# Patient Record
Sex: FEMALE | Race: WHITE
[De-identification: names, ages, dates, MRNs, and addresses within clinical notes are randomized per-mention and may not be internally consistent; named-entity substitution may affect disease eponyms.]

---

## 2017-01-22 NOTE — EMERGENCY ROOM VISIT NOTE
History


Report prepared by Sharron:  Gabbi Mariee


Under the Supervision of:  Dr. Escobar Rosenberg M.D.


First contact with patient:  21:46


Chief Complaint:  VERTIGO


Stated Complaint:  SORES,NOSE BLEED,VAGINAL BLEEDING,VERTIGO


Nursing Triage Summary:  


Triage Notes:  rash/scabs on torso and arms. pt reports she had a nose bleed 


today. pt reports she had her period on 1/3 and got it again today.  pt reports 


she has never had an irregular period.  reports noticed more frequent bruising 


recently.  also c/o joint pain.





History of Present Illness


The patient is a 21 year old female who presents to the Emergency Room via 

mother with complaints of persistent vertigo with onset PTA. The patient notes 

that she has sores all over her body. Today, she became dizzy after she stood 

up. She has some chest tightness. The patient states that she had a nose bleed 

today. The patient had her period on 1/3 and got her period again today. She 

notes that she has never had an irregular period. The patient has noticed more 

frequent bruising and she has some joint pain. The patient has some abdominal 

pain. The patient has headaches and feels as if she may pass out. When she lies 

down, she feels as if a film goes over her eyes. When asked about any 

medications or drug use, the patient states she had been abusing 

methamphetamine. She had been using this weekly for the past three months. She 

states she was using IV meth and that she has now stopped.  Pt denies LOC, 

fevers, chills, diaphoresis, visual changes, neck pain, chest pain, breathing 

difficulties, nausea, vomiting, abdominal pain, back pain, melena, hematochezia

, urinary symptoms, numbness, weakness, lymphadenopathy, or other complaints.





   Source of History:  patient


   Onset:  PTA


   Position:  other (global)


   Quality:  other (vertigo)


   Timing:  other (persistent)


   Associated Symptoms:  + headache


Note:


The patient notes that she has sores all over her body. Today, she became dizzy 

after she stood up. She has some chest tightness. The patient states that she 

had a nose bleed today. The patient had her period on 1/3 and got her period 

again today. The patient has noticed more frequent bruising and she has some 

joint pain.  When she lies down, she feels as if a film goes over her eyes.





Review of Systems


See HPI for pertinent positives and negatives.  A total of ten systems were 

reviewed and were otherwise negative.





Past Medical & Surgical


Medical Problems:


(1) Asthma


(2) Elevated liver enzymes


(3) possible labor


(4) Pregnancy


Surgical Problems:


(1) H/O wisdom tooth extraction








Family History





Cancer


Diabetes mellitus


Hypertension





Social History


Smoking Status:  Never Smoker


Alcohol Use:  none


Drug Use:  none


Housing Status:  lives alone


Occupation Status:  unemployed





Current/Historical Medications


Scheduled PRN


Albuterol (Ventolin Hfa), 2 PUFF INH Q4 PRN for SOB/Wheezing





Allergies


Coded Allergies:  


     No Known Allergies (Unverified , 7/11/16)





Physical Exam


Vital Signs











  Date Time  Temp Pulse Resp B/P Pulse Ox O2 Delivery O2 Flow Rate FiO2


 


1/22/17 00:20 36.6 74 18 107/69 97   


 


1/22/17 00:08  74 18 107/69 97 Room Air  


 


1/21/17 23:34  78 17  97   


 


1/21/17 23:29  81 17  97   


 


1/21/17 23:24  76 17  96   


 


1/21/17 23:19  93 16  98   


 


1/21/17 23:14  75 18  97   


 


1/21/17 23:09  79 18  97   


 


1/21/17 23:04  90 18  96   


 


1/21/17 22:59  91 29  97   


 


1/21/17 22:58    115/66    


 


1/21/17 22:54  80 21  97   


 


1/21/17 22:49  78 20  97   


 


1/21/17 22:44  81 25  97   


 


1/21/17 22:39  84 22  98   


 


1/21/17 22:34  76 18  97   


 


1/21/17 22:29  83 20  95   


 


1/21/17 22:24  82 21  99   


 


1/21/17 22:19  82 22  96   


 


1/21/17 22:14  78 15  97   


 


1/21/17 22:10  82      


 


1/21/17 22:09  83 21  98   


 


1/21/17 22:08  79 18 113/65 97 Room Air  


 


1/21/17 22:07     96 Room Air  


 


1/21/17 22:07    113/65    


 


1/21/17 20:40 36.6 89 18 139/92 96 Room Air  











Physical Exam


GENERAL: Awake, alert, well-appearing, in no distress


HENT: Normocephalic, atraumatic. Oropharynx unremarkable. Nose is unremarkable.


EYES: Normal conjunctiva. Sclera non-icteric.


NECK: Supple. No nuchal rigidity. FROM. No JVD.


RESPIRATORY: Clear to auscultation.


CARDIAC: Regular rate, normal rhythm. Extremities warm and well perfused. 

Pulses equal.


ABDOMEN: Soft, non-distended. No tenderness to palpation. No rebound or 

guarding. No masses.


RECTAL: Deferred.


MUSCULOSKELETAL: Chest examination reveals no tenderness. There are small 

excoriations on the chest wall.  The back is symmetrical on inspection without 

obvious abnormality. There is no CVA tenderness to palpation. No joint edema. 

There are track marks on the left AC. 


LOWER EXTREMITIES: Calves are equal size bilaterally and non-tender. No edema. 

No discoloration. 


NEURO: Normal sensorium. No sensory or motor deficits noted. 


SKIN: No rash or jaundice noted.





Medical Decision & Procedures


Laboratory Results


1/21/17 22:01








Red Blood Count 5.25, Mean Corpuscular Volume 82.3, Mean Corpuscular Hemoglobin 

28.0, Mean Corpuscular Hemoglobin Concent 34.0, Mean Platelet Volume 9.3, 

Neutrophils (%) (Auto) 47.1, Lymphocytes (%) (Auto) 39.7, Monocytes (%) (Auto) 

10.9, Eosinophils (%) (Auto) 1.7, Basophils (%) (Auto) 0.5, Neutrophils # (Auto

) 3.84, Lymphocytes # (Auto) 3.24, Monocytes # (Auto) 0.89, Eosinophils # (Auto

) 0.14, Basophils # (Auto) 0.04





1/21/17 22:01

















Test


  1/21/17


22:01 1/21/17


22:05 1/21/17


23:55


 


White Blood Count


  8.16 K/uL


(4.8-10.8) 


  


 


 


Red Blood Count


  5.25 M/uL


(4.2-5.4) 


  


 


 


Hemoglobin


  14.7 g/dL


(12.0-16.0) 


  


 


 


Hematocrit 43.2 % (37-47)   


 


Mean Corpuscular Volume


  82.3 fL


() 


  


 


 


Mean Corpuscular Hemoglobin


  28.0 pg


(25-34) 


  


 


 


Mean Corpuscular Hemoglobin


Concent 34.0 g/dl


(32-36) 


  


 


 


Platelet Count


  397 K/uL


(130-400) 


  


 


 


Mean Platelet Volume


  9.3 fL


(7.4-10.4) 


  


 


 


Neutrophils (%) (Auto) 47.1 %   


 


Lymphocytes (%) (Auto) 39.7 %   


 


Monocytes (%) (Auto) 10.9 %   


 


Eosinophils (%) (Auto) 1.7 %   


 


Basophils (%) (Auto) 0.5 %   


 


Neutrophils # (Auto)


  3.84 K/uL


(1.4-6.5) 


  


 


 


Lymphocytes # (Auto)


  3.24 K/uL


(1.2-3.4) 


  


 


 


Monocytes # (Auto)


  0.89 K/uL


(0.11-0.59) 


  


 


 


Eosinophils # (Auto)


  0.14 K/uL


(0-0.5) 


  


 


 


Basophils # (Auto)


  0.04 K/uL


(0-0.2) 


  


 


 


RDW Standard Deviation


  41.6 fL


(36.4-46.3) 


  


 


 


RDW Coefficient of Variation


  13.9 %


(11.5-14.5) 


  


 


 


Immature Granulocyte % (Auto) 0.1 %   


 


Immature Granulocyte # (Auto)


  0.01 K/uL


(0.00-0.02) 


  


 


 


Prothrombin Time


  11.0 SECONDS


(9.0-12.0) 


  


 


 


Prothromb Time International


Ratio 1.0 (0.9-1.1) 


  


  


 


 


Activated Partial


Thromboplast Time 32.9 SECONDS


(21.0-31.0) 


  


 


 


Partial Thromboplastin Ratio 1.3   


 


Anion Gap


  11.0 mmol/L


(3-11) 


  


 


 


Est Creatinine Clear Calc


Drug Dose 133.2 ml/min 


  


  


 


 


Estimated GFR (


American) 125.8 


  


  


 


 


Estimated GFR (Non-


American 108.5 


  


  


 


 


BUN/Creatinine Ratio 13.5 (10-20)   


 


Calcium Level


  9.6 mg/dl


(8.5-10.1) 


  


 


 


Total Bilirubin


  0.3 mg/dl


(0.2-1) 


  


 


 


Aspartate Amino Transf


(AST/SGOT) 17 U/L (15-37) 


  


  


 


 


Alanine Aminotransferase


(ALT/SGPT) 26 U/L (12-78) 


  


  


 


 


Alkaline Phosphatase


  109 U/L


() 


  


 


 


Total Protein


  8.6 gm/dl


(6.4-8.2) 


  


 


 


Albumin


  4.3 gm/dl


(3.4-5.0) 


  


 


 


Globulin


  4.3 gm/dl


(2.5-4.0) 


  


 


 


Albumin/Globulin Ratio 1.0 (0.9-2)   


 


Urine Color  YELLOW  


 


Urine Appearance  CLEAR (CLEAR)  


 


Urine pH  5.5 (4.5-7.5)  


 


Urine Specific Gravity


  


  1.030


(1.000-1.030) 


 


 


Urine Protein  NEG (NEG)  


 


Urine Glucose (UA)  NEG (NEG)  


 


Urine Ketones  TRACE (NEG)  


 


Urine Occult Blood  2+ (NEG)  


 


Urine Nitrite  NEG (NEG)  


 


Urine Bilirubin  NEG (NEG)  


 


Urine Urobilinogen  NEG (NEG)  


 


Urine Leukocyte Esterase  NEG (NEG)  


 


Urine WBC (Auto)  1-5 /hpf (0-5)  


 


Urine RBC (Auto)


  


  5-10 /hpf


(0-4) 


 


 


Urine Hyaline Casts (Auto)  1-5 /lpf (0-5)  


 


Urine Epithelial Cells (Auto)


  


  10-20 /lpf


(0-5) 


 


 


Urine Bacteria (Auto)  NEG (NEG)  


 


Urine Pregnancy Test  NEG (NEG)  


 


Bedside Glucose


  


  


  88 mg/dl


(70-90)





Laboratory results reviewed by me





ED Course


2220: The patient was evaluated in room B7. A complete history and physical 

exam was performed.





2358: I reevaluated the patient. She is doing well and has agreed to stop doing 

drugs. Discussed results and discharge instructions: She verbalized 

understanding and agreement. The patient is ready for discharge.





Medical Decision


Triage Nursing notes reviewed.


The patient's presentation and history were concerning for the symptoms above 

and methamphetamine use.  





Etiologies such as toxicologic, metabolic, infection, hypo/hyperglycemia, 

electrolyte abnormalities, cardiac sources, intracerebral event,  neurologic, 

as well as others were entertained.   





The patient was evaluated.  She had multiple complaints.  Her skin lesions are 

consistent with that seen within methamphetamine abuse.  Nose examination was 

unremarkable.  The patient has had some irregularity in her menstrual cycle.  

She had some dizziness and general malaise.  There is no sign of infection but 

she did have track marks.  I believe that her issue is due to her drug abuse.  

The patient had an unremarkable CBC, urinalysis, coags, chemistry panel except 

for a mildly low glucose.  I believe this was technical and it was repeated and 

was normal.  The patient was counseled on her methamphetamine abuse.  She has 

not used in over a week.  I asked her to follow closely with her primary 

physician for additional help with this.  If she worsens in any way she will 

come back to the Emergency Room.  Family was with her and was in agreement.





By the evaluation outlined above other emergent etiologies such as those listed 

in the differential, as well as others, were deemed relatively unlikely.  





The patient family were informed about the findings as listed above.  All 

questions were answered and they were pleased with the treatment.  Return 

instructions were outlined and the patient was discharged in stable condition.  





The patient was referred to her PCP for follow-up for a recheck of the current 

condition.





The chart was completed utilizing Dragon Speech voice recognition software.   

Grammatical errors, random word insertions, pronoun errors, and incomplete 

sentences are an occasional consequence of this system due to software 

limitations, ambient noise, and hardware issues.  Any formal questions or 

concerns about the content, text, or information contained within the body of 

this dictation should be directly addressed to the physician for clarification.





Impression





 Primary Impression:  


 Methamphetamine abuse





Scribe Attestation


The scribe's documentation has been prepared under my direction and personally 

reviewed by me in its entirety. I confirm that the note above accurately 

reflects all work, treatment, procedures, and medical decision making performed 

by me.





Departure Information


Dispostion


Home / Self-Care





Referrals


No Doctor, Assigned (PCP)





Forms


HOME CARE DOCUMENTATION FORM,                                                 

               IMPORTANT VISIT INFORMATION, WORK / SCHOOL INSTRUCTIONS





Patient Instructions


My [facility]





Additional Instructions





Do not do drugs.





Follow-up with your primary care physician in 2 to 3 days for a recheck of your 

current condition.





Diphenhydramine:  Use 25 to 50 mg every six hours for swelling, itching, or 

hives.  This medication may cause sedation.  Do not drive or perform dangerous 

activity if you are using this medication.





Return to the ER immediately for chest pain, difficulty breathing, passing out, 

vaginal bleeding more than one pad an hour for 3 consecutive hours, spreading 

redness, fevers, pus-like drainage, severe pain, or as needed.

## 2017-01-30 NOTE — EMERGENCY ROOM VISIT NOTE
History


Report prepared by Sharron:  Savanna Odonnell


Under the Supervision of:  Dr. Alverto Brooks D.O.


First contact with patient:  20:48


Chief Complaint:  MENTAL HEALTH EVALUATION


Stated Complaint:  DRUG ABUSE,UNSTABLE THOUGHTS





History of Present Illness


The patient is a 21 year old female who presents to the Emergency Room for 

persistent anxiety and depression starting a few days ago. She used bath salts, 

Xanax, alcohol, and IV drugs 2 days ago. She has suicidal thoughts everyday 

with plans to overdose. She is looking for detox today. The patient was 

evaluated in the Emergency about a week ago for drug use. She was checked for 

HIV and Hepatitis. She does not have a formal diagnosis of anxiety and 

depression. She denies chest pain, shortness of breath, or any other 

complaints. Her last normal menstrual period was on Jan 20.





   Source of History:  patient


   Onset:  a few days ago


   Position:  other (global)


   Quality:  other (anxiety and depression)


   Timing:  other (persistent)


   Associated Symptoms:  No SOB, No chest pain





Review of Systems


See HPI for pertinent positives & negatives. A total of 10 systems reviewed and 

were otherwise negative.





Past Medical & Surgical


Medical Problems:


(1) Asthma


(2) Elevated liver enzymes


(3) possible labor


(4) Pregnancy


Surgical Problems:


(1) H/O wisdom tooth extraction








Family History





Cancer


Diabetes mellitus


Hypertension





Social History


Smoking Status:  Never Smoker


Alcohol Use:  none


Drug Use:  none


Housing Status:  lives alone


Occupation Status:  unemployed





Current/Historical Medications


No Active Prescriptions or Reported Meds





Allergies


Coded Allergies:  


     No Known Allergies (Unverified , 1/30/17)





Physical Exam


Vital Signs











  Date Time  Temp Pulse Resp B/P Pulse Ox O2 Delivery O2 Flow Rate FiO2


 


1/30/17 23:08  78 18 108/63 99   


 


1/30/17 22:43  78 18 108/63 99 Room Air  


 


1/30/17 20:32 36.5 91 16 122/80 100 Room Air  











Physical Exam


GENERAL:  Patient is awake, alert, and in no acute distress. Patient is resting 

comfortably and showing no signs of anxiety


EYES: The conjunctivae are clear.  The pupils are round and reactive. 


EARS, NOSE, MOUTH AND THROAT: The nose is without any evidence of any 

deformity. Mucous membranes are moist tongue is midline 


NECK: The neck is nontender and supple.


RESPIRATORY: Normal respiratory effort is noted there is no evidence of 

wheezing rhonchi or rales


CARDIOVASCULAR:  Regular rate and rhythm noted there no murmurs rubs or gallops 

normal S1 normal S2 


GASTROINTESTINAL: The abdomen is soft. Bowel sounds are present in all 

quadrants. Abdomen is nontender


MUSCULOSKELETAL/EXTREMITIES: There is no evidence of gross deformity full range 

of motion is noted in the hips and shoulders


SKIN: There is no obvious evidence of any rash. There are no petechiae, pallor 

or cyanosis noted. 


NEUROLOGIC:  Patient is awake alert and oriented x3 strength is symmetric 

patellar reflexes are 2+ bilaterally


PSYCHIATRIC: Affect is flat. Patient has vague and passive suicidal ideation 

but no plan. Denying any suicidal ideation at this time.





Medical Decision & Procedures


Laboratory Results


1/30/17 22:02








Red Blood Count 5.00, Mean Corpuscular Volume 81.8, Mean Corpuscular Hemoglobin 

28.2, Mean Corpuscular Hemoglobin Concent 34.5, Mean Platelet Volume 9.9, 

Neutrophils (%) (Auto) 51.6, Lymphocytes (%) (Auto) 38.4, Monocytes (%) (Auto) 

8.2, Eosinophils (%) (Auto) 1.0, Basophils (%) (Auto) 0.6, Neutrophils # (Auto) 

2.63, Lymphocytes # (Auto) 1.96, Monocytes # (Auto) 0.42, Eosinophils # (Auto) 

0.05, Basophils # (Auto) 0.03





1/30/17 22:02

















Test


  1/30/17


20:53 1/30/17


22:02


 


Urine Color DK YELLOW  


 


Urine Appearance CLOUDY (CLEAR)  


 


Urine pH 5.0 (4.5-7.5)  


 


Urine Specific Gravity


  1.027


(1.000-1.030) 


 


 


Urine Protein NEG (NEG)  


 


Urine Glucose (UA) NEG (NEG)  


 


Urine Ketones TRACE (NEG)  


 


Urine Occult Blood NEG (NEG)  


 


Urine Nitrite NEG (NEG)  


 


Urine Bilirubin NEG (NEG)  


 


Urine Urobilinogen NEG (NEG)  


 


Urine Leukocyte Esterase MODERATE (NEG)  


 


Urine WBC (Auto) >30 /hpf (0-5)  


 


Urine RBC (Auto) 0-4 /hpf (0-4)  


 


Urine Hyaline Casts (Auto)


  10-30 /lpf


(0-5) 


 


 


Urine Epithelial Cells (Auto) >30 /lpf (0-5)  


 


Urine Bacteria (Auto) 2+ (NEG)  


 


Urine Pregnancy Test NEG (NEG)  


 


Urine Opiates Screen NEG (NEG)  


 


Urine Methadone, Qualitative NEG (NEG)  


 


Urine Barbiturates NEG (NEG)  


 


Urine Phencyclidine (PCP)


Level NEG (NEG) 


  


 


 


Ur


Amphetamine/Methamphetamine POS (NEG) 


  


 


 


MDMA (Ecstasy) Screen NEG (NEG)  


 


Urine Benzodiazepines Screen NEG (NEG)  


 


Urine Cocaine Metabolite NEG (NEG)  


 


Urine Marijuana (THC) POS (NEG)  


 


White Blood Count


  


  5.10 K/uL


(4.8-10.8)


 


Red Blood Count


  


  5.00 M/uL


(4.2-5.4)


 


Hemoglobin


  


  14.1 g/dL


(12.0-16.0)


 


Hematocrit  40.9 % (37-47) 


 


Mean Corpuscular Volume


  


  81.8 fL


()


 


Mean Corpuscular Hemoglobin


  


  28.2 pg


(25-34)


 


Mean Corpuscular Hemoglobin


Concent 


  34.5 g/dl


(32-36)


 


Platelet Count


  


  327 K/uL


(130-400)


 


Mean Platelet Volume


  


  9.9 fL


(7.4-10.4)


 


Neutrophils (%) (Auto)  51.6 % 


 


Lymphocytes (%) (Auto)  38.4 % 


 


Monocytes (%) (Auto)  8.2 % 


 


Eosinophils (%) (Auto)  1.0 % 


 


Basophils (%) (Auto)  0.6 % 


 


Neutrophils # (Auto)


  


  2.63 K/uL


(1.4-6.5)


 


Lymphocytes # (Auto)


  


  1.96 K/uL


(1.2-3.4)


 


Monocytes # (Auto)


  


  0.42 K/uL


(0.11-0.59)


 


Eosinophils # (Auto)


  


  0.05 K/uL


(0-0.5)


 


Basophils # (Auto)


  


  0.03 K/uL


(0-0.2)


 


RDW Standard Deviation


  


  42.2 fL


(36.4-46.3)


 


RDW Coefficient of Variation


  


  14.1 %


(11.5-14.5)


 


Immature Granulocyte % (Auto)  0.2 % 


 


Immature Granulocyte # (Auto)


  


  0.01 K/uL


(0.00-0.02)


 


Anion Gap


  


  11.0 mmol/L


(3-11)


 


Est Creatinine Clear Calc


Drug Dose 


  137.3 ml/min 


 


 


Estimated GFR (


American) 


  131.8 


 


 


Estimated GFR (Non-


American 


  113.7 


 


 


BUN/Creatinine Ratio  16.4 (10-20) 


 


Calcium Level


  


  8.7 mg/dl


(8.5-10.1)


 


Total Bilirubin


  


  0.3 mg/dl


(0.2-1)


 


Direct Bilirubin


  


  < 0.1 mg/dl


(0-0.2)


 


Aspartate Amino Transf


(AST/SGOT) 


  13 U/L (15-37) 


 


 


Alanine Aminotransferase


(ALT/SGPT) 


  19 U/L (12-78) 


 


 


Alkaline Phosphatase


  


  96 U/L


()


 


Total Protein


  


  8.3 gm/dl


(6.4-8.2)


 


Albumin


  


  3.8 gm/dl


(3.4-5.0)


 


Globulin


  


  4.5 gm/dl


(2.5-4.0)


 


Albumin/Globulin Ratio  0.8 (0.9-2) 


 


Thyroid Stimulating Hormone


(TSH) 


  0.535 uIu/ml


(0.300-4.500)


 


Salicylates Level


  


  < 1.7 mg/dl


(2.8-20)


 


Acetaminophen Level


  


  < 2 ug/ml


(10-30)














Laboratory results per my review.





Medications Administered











 Medications


  (Trade)  Dose


 Ordered  Sig/Areli


 Route  Start Time


 Stop Time Status Last Admin


Dose Admin


 


 Lorazepam


  (Ativan Tab)  1 mg  NOW  STAT


 SL  1/30/17 21:06


 1/30/17 21:07 DC 1/30/17 21:30


1 MG


 


 Cephalexin


 Monohydrate


  (Keflex Cap)  500 mg  NOW  ONCE


 PO  1/30/17 22:15


 1/30/17 22:16 DC 1/30/17 22:57


500 MG











ED Course


2048: The patient was evaluated in room A09B. A complete history and physical 

examination were performed. 





2106: Ativan Tab 1 mg SL





2215: Keflex Cap 500 mg PO 





2355: Upon reevaluation, the patient is resting comfortably. I discussed 

results and treatment plan with her. She verbalizes agreement and 

understanding. She has been accepted to [facility] for inpatient detox. 

The patient will be evaluated for further management and care.





Medical Decision


Differential diagnosis:


Etiologies such as mood disorder, infection, hypoglycemia, electrolyte 

abnormalities, cardiac sources, intracerebral event, toxicologic, neurologic, 

as well as others were entertained.





Nursing notes reviewed.





The patient is a 21-year-old female who presented to the emergency department 

with family members for an evaluation of drug abuse and requesting detox. The 

patient admitted to using multiple drugs as well as alcohol. She has not used 

any substances since Saturday. The patient was medically cleared in the 

emergency department. She did not appear to have signs of withdrawal. She was 

also started on an antibiotic for presumed urinary tract infection. She was 

evaluated by the emergency department as well as  and was referred 

to [facility] and was accepted for inpatient detox. The patient was agreeable to 

inpatient detox and was transported for further inpatient management. She was 

reevaluated multiple times. She was treated with Ativan in the emergency 

department. On subsequent reevaluation she was feeling much better.





Impression





 Primary Impression:  


 Drug abuse


 Additional Impression:  


 Suicidal ideation





Scribe Attestation


The scribe's documentation has been prepared under my direction and personally 

reviewed by me in its entirety. I confirm that the note above accurately 

reflects all work, treatment, procedures, and medical decision making performed 

by me.





Departure Information


Dispostion


Mental Health Acute Care





Prescriptions





No Active Prescriptions or Reported Meds





Referrals


No Doctor, Assigned (PCP)





Patient Instructions


My [facility]





Problem Qualifiers

## 2018-08-22 ENCOUNTER — HOSPITAL ENCOUNTER (OUTPATIENT)
Dept: HOSPITAL 45 - C.OPB | Age: 23
End: 2018-08-22
Attending: OBSTETRICS & GYNECOLOGY
Payer: COMMERCIAL